# Patient Record
(demographics unavailable — no encounter records)

---

## 2025-02-12 NOTE — REVIEW OF SYSTEMS
[Fatigue] : no fatigue [Decreased Appetite] : appetite not decreased [Fever] : no fever [Chills] : no chills [Blurred Vision] : no blurred vision [Eyes Itch] : no itch [Dysphagia] : no dysphagia [Hearing Loss] : no hearing loss  [Chest Pain] : no chest pain [Palpitations] : no palpitations [Lower Ext Edema] : no lower extremity edema [Shortness Of Breath] : no shortness of breath [Cough] : no cough [Wheezing] : no wheezing [SOB on Exertion] : no shortness of breath on exertion [Nausea] : no nausea [Constipation] : no constipation [Heartburn] : no heartburn [Diarrhea] : no diarrhea [Polyuria] : no polyuria [Dysuria] : no dysuria [Muscle Weakness] : no muscle weakness [Dry Skin] : no dry skin [Hair Loss] : no hair loss [Ulcer] : no ulcer [Headaches] : no headaches [Difficulty Walking] : no difficulty walking [Tremors] : no tremors [Pain/Numbness of Digits] : no pain/numbness of digits [Anxiety] : no anxiety [Stress] : no stress [Cold Intolerance] : no cold intolerance [Heat Intolerance] : no heat intolerance [Easy Bleeding] : no ~M tendency for easy bleeding [Easy Bruising] : no tendency for easy bruising [FreeTextEntry2] : Has gained 4 lb since her last visit [FreeTextEntry3] : Using Systane for dry eyes [FreeTextEntry4] : Chronic dry mouth--always has water bottle handy, also sucks on hard candies [FreeTextEntry7] : Uses Nexium PRN for heartburn [FreeTextEntry8] : Occasional nocturia [FreeTextEntry9] : Says that "everything aches" [de-identified] : Tends to drink a large amount of fluid to treat her dry mouth

## 2025-02-12 NOTE — PHYSICAL EXAM
[Kyphosis] : no kyphosis present [Acanthosis Nigricans] : no acanthosis nigricans [Foot Ulcers] : no foot ulcers [Hirsutism] : no hirsutism [de-identified] : No corneal arcus [de-identified] : Thyroid feels borderline enlarged, somewhat firm and irregular in consistency [de-identified] : DP pulses 2+ bilaterally

## 2025-02-12 NOTE — HISTORY OF PRESENT ILLNESS
[FreeTextEntry1] : Has been taking levothyroxine 62.5 mcg/day plus Cytomel 25 mcg/day Has been doing bio-identical hormone therapy with a plant-based estradiol-progesterone-testosterone tablet which she takes daily.  Does not have any bleeding on this preparatioin.  Has noted a definite increase in mood, and says that the "brain fog has lifted."  Her vasomotor symptoms have essentially disappeared. Has no problems with hair loss.  Feels that the current regimen is doing its job She has never had a DEXA. In terms of hyperlipidemia, both parents had hypercholesterolemia, neither had heart disease Father  at age 71 of lung CA, but she thinks that he may have had a "small heart attack" in earlier years.  Mother has not had any heart disease. She thinks that  --Skips breakfast and lunch --Snacks during the day--frappaccinos, cheese, occasional chocolate --Protein at supper is usually poultry.  Starches are somewhat limited. --Tends to eat chocolate at night --Intake of baked goods is minimal She had a CT scan for calcium scoring two years ago which showed a score of zero         2-yo woman with a history of hypothyroidism which was diagnosed a few months after her second pregnancy.  Was treated with levothyroxine initially, started combination T3/T4 therapy within a fwe months because of significant fatigue despite normal TSH levels.  (Had seen a number of endocrinologists without success, finally came under the care of Dr. Man, who followed her until ).  Has not seen another endocrinologist since then--prescriptions are being written by her PCP. Her PCP then tried stopping the Cytomel, but she developed significant hair loss afterward.  She then found another PCP who was willing to put her back on ti.  She now takes 88 mcg levothyroxine plus 15 mcg Cytomel 5 days/week--but does this on a random basis--i.e. if she fails to wake up in the middle of the night. Her main side-effect if the Cytomel is stopped is hair loss.  She does not see any change in her energy level if she takes it or not. Her TFTs in May showed a free T4 of 1.1 ng/dl (0.8-1.8), TSH 0.01 uU/ml Her metformin was also started after her second pregnancy, when Dr. Man diagnosed insulin resistance.  She is now on 1000 mg once a day (immediate-release)  and has no side-effects. She has now been menopausal for 2 years.  Her vasomotor symptoms are severe, and she will be seeing a physician later this week about alternative therapy. Her current medical issues also include apparent sicca syndrome, as well as Reynaud's.    4 pregnancies, 2 children  Gestational DM with the second pregnancy, age 30.  Did not take insulin.  Had problems with hypoglyemia after pregnancies.  Both children < 9 lb

## 2025-02-12 NOTE — PHYSICAL EXAM
[Kyphosis] : no kyphosis present [Acanthosis Nigricans] : no acanthosis nigricans [Foot Ulcers] : no foot ulcers [Hirsutism] : no hirsutism [de-identified] : No corneal arcus [de-identified] : Thyroid feels borderline enlarged, somewhat firm and irregular in consistency [de-identified] : DP pulses 2+ bilaterally

## 2025-02-12 NOTE — REVIEW OF SYSTEMS
[Fatigue] : no fatigue [Decreased Appetite] : appetite not decreased [Fever] : no fever [Chills] : no chills [Blurred Vision] : no blurred vision [Eyes Itch] : no itch [Dysphagia] : no dysphagia [Hearing Loss] : no hearing loss  [Chest Pain] : no chest pain [Palpitations] : no palpitations [Lower Ext Edema] : no lower extremity edema [Shortness Of Breath] : no shortness of breath [Cough] : no cough [Wheezing] : no wheezing [SOB on Exertion] : no shortness of breath on exertion [Nausea] : no nausea [Constipation] : no constipation [Heartburn] : no heartburn [Diarrhea] : no diarrhea [Polyuria] : no polyuria [Dysuria] : no dysuria [Muscle Weakness] : no muscle weakness [Dry Skin] : no dry skin [Hair Loss] : no hair loss [Ulcer] : no ulcer [Headaches] : no headaches [Difficulty Walking] : no difficulty walking [Tremors] : no tremors [Pain/Numbness of Digits] : no pain/numbness of digits [Anxiety] : no anxiety [Stress] : no stress [Cold Intolerance] : no cold intolerance [Heat Intolerance] : no heat intolerance [Easy Bleeding] : no ~M tendency for easy bleeding [Easy Bruising] : no tendency for easy bruising [FreeTextEntry2] : Has gained 4 lb since her last visit [FreeTextEntry3] : Using Systane for dry eyes [FreeTextEntry4] : Chronic dry mouth--always has water bottle handy, also sucks on hard candies [FreeTextEntry7] : Uses Nexium PRN for heartburn [FreeTextEntry8] : Occasional nocturia [FreeTextEntry9] : Says that "everything aches" [de-identified] : Tends to drink a large amount of fluid to treat her dry mouth